# Patient Record
Sex: FEMALE | Race: WHITE | NOT HISPANIC OR LATINO
[De-identification: names, ages, dates, MRNs, and addresses within clinical notes are randomized per-mention and may not be internally consistent; named-entity substitution may affect disease eponyms.]

---

## 2017-01-04 ENCOUNTER — RECORDS - HEALTHEAST (OUTPATIENT)
Dept: ADMINISTRATIVE | Facility: OTHER | Age: 32
End: 2017-01-04

## 2017-01-09 ENCOUNTER — COMMUNICATION - HEALTHEAST (OUTPATIENT)
Dept: MIDWIFE SERVICES | Facility: CLINIC | Age: 32
End: 2017-01-09

## 2017-01-09 ENCOUNTER — AMBULATORY - HEALTHEAST (OUTPATIENT)
Dept: MIDWIFE SERVICES | Facility: CLINIC | Age: 32
End: 2017-01-09

## 2017-01-12 ENCOUNTER — AMBULATORY - HEALTHEAST (OUTPATIENT)
Dept: MIDWIFE SERVICES | Facility: CLINIC | Age: 32
End: 2017-01-12

## 2017-02-20 ENCOUNTER — COMMUNICATION - HEALTHEAST (OUTPATIENT)
Dept: ADMINISTRATIVE | Facility: CLINIC | Age: 32
End: 2017-02-20

## 2017-02-21 ENCOUNTER — PRENATAL OFFICE VISIT - HEALTHEAST (OUTPATIENT)
Dept: MIDWIFE SERVICES | Facility: CLINIC | Age: 32
End: 2017-02-21

## 2017-02-21 DIAGNOSIS — Z34.80 ENCOUNTER FOR SUPERVISION OF OTHER NORMAL PREGNANCY: ICD-10-CM

## 2017-02-21 ASSESSMENT — MIFFLIN-ST. JEOR: SCORE: 1290.56

## 2017-02-23 LAB — SYPHILIS RPR SCREEN - HISTORICAL: NORMAL

## 2017-03-08 ENCOUNTER — PRENATAL OFFICE VISIT - HEALTHEAST (OUTPATIENT)
Dept: MIDWIFE SERVICES | Facility: CLINIC | Age: 32
End: 2017-03-08

## 2017-03-08 DIAGNOSIS — O99.013 ANTEPARTUM ANEMIA, THIRD TRIMESTER: ICD-10-CM

## 2017-03-08 DIAGNOSIS — Z28.39 RUBELLA NON-IMMUNE STATUS, ANTEPARTUM: ICD-10-CM

## 2017-03-08 DIAGNOSIS — O09.899 RUBELLA NON-IMMUNE STATUS, ANTEPARTUM: ICD-10-CM

## 2017-03-08 DIAGNOSIS — Z34.83 ENCOUNTER FOR SUPERVISION OF OTHER NORMAL PREGNANCY, THIRD TRIMESTER: ICD-10-CM

## 2017-03-08 ASSESSMENT — MIFFLIN-ST. JEOR: SCORE: 1313.24

## 2017-03-27 ENCOUNTER — PRENATAL OFFICE VISIT - HEALTHEAST (OUTPATIENT)
Dept: MIDWIFE SERVICES | Facility: CLINIC | Age: 32
End: 2017-03-27

## 2017-03-27 DIAGNOSIS — O99.013 ANTEPARTUM ANEMIA, THIRD TRIMESTER: ICD-10-CM

## 2017-03-27 DIAGNOSIS — Z34.83 ENCOUNTER FOR SUPERVISION OF OTHER NORMAL PREGNANCY, THIRD TRIMESTER: ICD-10-CM

## 2017-03-27 ASSESSMENT — MIFFLIN-ST. JEOR: SCORE: 1322.31

## 2017-04-19 ENCOUNTER — PRENATAL OFFICE VISIT - HEALTHEAST (OUTPATIENT)
Dept: MIDWIFE SERVICES | Facility: CLINIC | Age: 32
End: 2017-04-19

## 2017-04-19 DIAGNOSIS — Z86.59 HISTORY OF DEPRESSION: ICD-10-CM

## 2017-04-19 DIAGNOSIS — Z34.83 ENCOUNTER FOR SUPERVISION OF OTHER NORMAL PREGNANCY, THIRD TRIMESTER: ICD-10-CM

## 2017-04-19 ASSESSMENT — MIFFLIN-ST. JEOR: SCORE: 1340.45

## 2017-05-08 ENCOUNTER — PRENATAL OFFICE VISIT - HEALTHEAST (OUTPATIENT)
Dept: MIDWIFE SERVICES | Facility: CLINIC | Age: 32
End: 2017-05-08

## 2017-05-08 DIAGNOSIS — Z34.83 ENCOUNTER FOR SUPERVISION OF OTHER NORMAL PREGNANCY, THIRD TRIMESTER: ICD-10-CM

## 2017-05-08 ASSESSMENT — MIFFLIN-ST. JEOR: SCORE: 1349.53

## 2017-05-16 ENCOUNTER — HOSPITAL ENCOUNTER (OUTPATIENT)
Dept: ULTRASOUND IMAGING | Facility: HOSPITAL | Age: 32
Discharge: HOME | End: 2017-05-16
Attending: MIDWIFE

## 2017-05-16 ENCOUNTER — PRENATAL OFFICE VISIT - HEALTHEAST (OUTPATIENT)
Dept: MIDWIFE SERVICES | Facility: CLINIC | Age: 32
End: 2017-05-16

## 2017-05-16 DIAGNOSIS — O26.849 UTERINE SIZE-DATE DISCREPANCY, ANTEPARTUM: ICD-10-CM

## 2017-05-16 ASSESSMENT — MIFFLIN-ST. JEOR: SCORE: 1344.99

## 2017-05-17 ENCOUNTER — AMBULATORY - HEALTHEAST (OUTPATIENT)
Dept: OBGYN | Facility: CLINIC | Age: 32
End: 2017-05-17

## 2017-05-17 DIAGNOSIS — Z28.39 RUBELLA NON-IMMUNE STATUS, ANTEPARTUM: ICD-10-CM

## 2017-05-17 DIAGNOSIS — O99.013 ANTEPARTUM ANEMIA, THIRD TRIMESTER: ICD-10-CM

## 2017-05-17 DIAGNOSIS — Z34.83 ENCOUNTER FOR SUPERVISION OF OTHER NORMAL PREGNANCY, THIRD TRIMESTER: ICD-10-CM

## 2017-05-17 DIAGNOSIS — O09.899 RUBELLA NON-IMMUNE STATUS, ANTEPARTUM: ICD-10-CM

## 2017-05-22 ENCOUNTER — COMMUNICATION - HEALTHEAST (OUTPATIENT)
Dept: OBGYN | Facility: CLINIC | Age: 32
End: 2017-05-22

## 2017-05-23 ENCOUNTER — PRENATAL OFFICE VISIT - HEALTHEAST (OUTPATIENT)
Dept: MIDWIFE SERVICES | Facility: CLINIC | Age: 32
End: 2017-05-23

## 2017-05-23 DIAGNOSIS — Z34.83 ENCOUNTER FOR SUPERVISION OF OTHER NORMAL PREGNANCY, THIRD TRIMESTER: ICD-10-CM

## 2017-05-23 ASSESSMENT — MIFFLIN-ST. JEOR: SCORE: 1352.93

## 2017-05-24 ENCOUNTER — COMMUNICATION - HEALTHEAST (OUTPATIENT)
Dept: OBGYN | Facility: CLINIC | Age: 32
End: 2017-05-24

## 2017-05-24 DIAGNOSIS — Z28.39 RUBELLA NON-IMMUNE STATUS, ANTEPARTUM: ICD-10-CM

## 2017-05-24 DIAGNOSIS — O09.899 RUBELLA NON-IMMUNE STATUS, ANTEPARTUM: ICD-10-CM

## 2017-05-25 ENCOUNTER — HOME CARE/HOSPICE - HEALTHEAST (OUTPATIENT)
Dept: HOME HEALTH SERVICES | Facility: HOME HEALTH | Age: 32
End: 2017-05-25

## 2017-05-27 ENCOUNTER — HOME CARE/HOSPICE - HEALTHEAST (OUTPATIENT)
Dept: HOME HEALTH SERVICES | Facility: HOME HEALTH | Age: 32
End: 2017-05-27

## 2017-05-27 ENCOUNTER — COMMUNICATION - HEALTHEAST (OUTPATIENT)
Dept: SCHEDULING | Facility: CLINIC | Age: 32
End: 2017-05-27

## 2017-05-27 ENCOUNTER — COMMUNICATION - HEALTHEAST (OUTPATIENT)
Dept: OBGYN | Facility: CLINIC | Age: 32
End: 2017-05-27

## 2017-06-01 ENCOUNTER — COMMUNICATION - HEALTHEAST (OUTPATIENT)
Dept: MIDWIFE SERVICES | Facility: CLINIC | Age: 32
End: 2017-06-01

## 2021-05-30 VITALS — BODY MASS INDEX: 24.27 KG/M2 | WEIGHT: 137 LBS | HEIGHT: 63 IN

## 2021-05-30 VITALS — HEIGHT: 63 IN | WEIGHT: 148 LBS | BODY MASS INDEX: 26.22 KG/M2

## 2021-05-30 VITALS — BODY MASS INDEX: 25.16 KG/M2 | HEIGHT: 63 IN | WEIGHT: 142 LBS

## 2021-05-30 VITALS — BODY MASS INDEX: 26.58 KG/M2 | HEIGHT: 63 IN | WEIGHT: 150 LBS

## 2021-05-30 VITALS — WEIGHT: 144 LBS | HEIGHT: 63 IN | BODY MASS INDEX: 25.52 KG/M2

## 2021-05-31 VITALS — WEIGHT: 149 LBS | HEIGHT: 63 IN | BODY MASS INDEX: 26.4 KG/M2

## 2021-05-31 VITALS — HEIGHT: 63 IN | BODY MASS INDEX: 26.71 KG/M2 | WEIGHT: 150.75 LBS

## 2021-06-09 NOTE — PROGRESS NOTES
Lucia here by herself for a routine 26 wk PNV. One hr glucose test today, hgb, RPR. Tdap next time.  Pt states that she lost track of time with her US visit and her son's visit--she knows she needs to come in for regular PNC.  Baby active. US reviewed. Looked at wt gain chart-- so pt is underwt so discussed healthy diet, has had good interval wt gain.  [facility] and PTL disc and written info given.

## 2021-06-09 NOTE — PROGRESS NOTES
Now taking SlowFe - few times a day, usually once a day. Again, encouraged twice daily as well as a liquid form of iron reiterating that taking iron >1 time a day is going to be the best way to really see a change in her hemoglobin. Taking with orange juice already. Questions on fetal positioning; baby is vertex by Leopold's today and LOT. No questions on birth plan or other handouts today and encouraged to look them through and then take a bit of time to tune in on what she wants in this pregnancy and birth as well in terms of her birth plan. Plan is to RTC 3 weeks.

## 2021-06-09 NOTE — PROGRESS NOTES
Lucia presents to the clinic today by herself.  All is well!  Baby is active, and encouraged daily fetal movement counting from now forward.  Denies loss of fluid, vaginal bleeding or regular uterine contractions.  28 week lab results reviewed.  Accepting of pertussis immunization today, and fetal benefits reviewed.  Taking slow FE once daily; this writer recommends twice daily and iron rich food list was given today.   labor precautions and danger signs and symptoms reviewed.  All questions answered.  Encouraged to call or return to clinic with any questions, concerns, or as needed.

## 2021-06-10 NOTE — PROGRESS NOTES
"Presents today with partner Damian, and son Filiberto. Working as a , evening shifts. She and Damian share childcare, but still have several days off together. She can dictate her work schedule, and is limiting her hours at this point. Plans to work until she goes into labor.  Filiberto for 4 months, he had an issue with with swallowing, secondary to a diagnosis of an enlarged larynx. She is hoping breastfeeding is easier with this baby. Noticing vaginal pressure when she walks, resolves with rest. Has had a few episodes of cramping that resolved with hydration and rest. Also complains of upper back pain. Discussed exercises, chiropractic and massage. May consider pelvic support belt. Carefully reviewed s/sx of PTL and when to call. Coping well with stuational depression related to the death of her sister (last December). EDPS today = 5. Denies drop in her mood. Planning vasectomy now. Encouraged to consider worst case scenario or vasectomy with a poor outcome of baby, Lucia says she \"is sure she doesn't want to be pregnant again.\" And would consider adoption if they decided they wanted another child. GBS, cervical exam and Hgb next visit. Last Hgb 9.9, has been taking Slow Fe BID.  "

## 2021-06-10 NOTE — PROGRESS NOTES
Lucia is here for a routine 39 wk PNV.  Disc US results, baby active. Reviewed labor instructions.

## 2021-06-10 NOTE — PROGRESS NOTES
Lucia is here by herself for a routine 38 wk PNV. Baby active and discussed DFMC. Taking Slow FE as iron supplement.  Pt says she has swollen feet at the end of day--min now. Education done. Size less than dates so ordered BPP with ERIC and EFW.

## 2021-06-10 NOTE — PROGRESS NOTES
Lucia presents alone today. Happy her son Filiberto is no longer sick (he had a febrile illness over the weekend). Lucia did not get the fever. Lucia has mild allergies, does not need any meds, does not want to try [place] Pot. Denies s/sx preeclampsia. VE: 1cm/40%/-1. Sutures palpated. Feeling well. GBS and Hgb today. NKDA.

## 2021-06-15 PROBLEM — O99.019 ANTEPARTUM ANEMIA: Status: ACTIVE | Noted: 2017-02-23

## 2021-07-14 PROBLEM — O26.849 UTERINE SIZE-DATE DISCREPANCY, ANTEPARTUM: Status: RESOLVED | Noted: 2017-05-16 | Resolved: 2017-05-25

## 2021-07-14 PROBLEM — Z34.90 PREGNANT: Status: RESOLVED | Noted: 2017-05-24 | Resolved: 2017-05-25
